# Patient Record
Sex: FEMALE | Race: WHITE | NOT HISPANIC OR LATINO | Employment: FULL TIME | URBAN - METROPOLITAN AREA
[De-identification: names, ages, dates, MRNs, and addresses within clinical notes are randomized per-mention and may not be internally consistent; named-entity substitution may affect disease eponyms.]

---

## 2024-02-07 ENCOUNTER — OFFICE VISIT (OUTPATIENT)
Dept: PRIMARY CARE | Facility: CLINIC | Age: 59
End: 2024-02-07
Payer: COMMERCIAL

## 2024-02-07 VITALS
DIASTOLIC BLOOD PRESSURE: 80 MMHG | OXYGEN SATURATION: 99 % | WEIGHT: 109 LBS | RESPIRATION RATE: 16 BRPM | HEART RATE: 70 BPM | SYSTOLIC BLOOD PRESSURE: 122 MMHG | BODY MASS INDEX: 21.4 KG/M2 | HEIGHT: 60 IN

## 2024-02-07 DIAGNOSIS — R73.9 HYPERGLYCEMIA: ICD-10-CM

## 2024-02-07 DIAGNOSIS — Z00.00 ROUTINE GENERAL MEDICAL EXAMINATION AT A HEALTH CARE FACILITY: Primary | ICD-10-CM

## 2024-02-07 DIAGNOSIS — Z12.31 SCREENING MAMMOGRAM FOR BREAST CANCER: ICD-10-CM

## 2024-02-07 DIAGNOSIS — Z13.6 SCREENING FOR CARDIOVASCULAR CONDITION: ICD-10-CM

## 2024-02-07 DIAGNOSIS — E53.8 B12 DEFICIENCY: ICD-10-CM

## 2024-02-07 DIAGNOSIS — M15.9 PRIMARY OSTEOARTHRITIS INVOLVING MULTIPLE JOINTS: ICD-10-CM

## 2024-02-07 DIAGNOSIS — Z13.29 SCREENING FOR HYPOTHYROIDISM: ICD-10-CM

## 2024-02-07 PROCEDURE — 99396 PREV VISIT EST AGE 40-64: CPT | Performed by: INTERNAL MEDICINE

## 2024-02-07 PROCEDURE — 1036F TOBACCO NON-USER: CPT | Performed by: INTERNAL MEDICINE

## 2024-02-07 RX ORDER — HYDROXYZINE HYDROCHLORIDE 50 MG/1
50 TABLET, FILM COATED ORAL EVERY 24 HOURS
Qty: 90 TABLET | Refills: 3 | Status: SHIPPED | OUTPATIENT
Start: 2024-02-07 | End: 2025-02-06

## 2024-02-07 RX ORDER — HYDROXYZINE HYDROCHLORIDE 50 MG/1
50 TABLET, FILM COATED ORAL EVERY 24 HOURS
COMMUNITY
Start: 2022-12-21 | End: 2024-02-07 | Stop reason: SDUPTHER

## 2024-02-07 ASSESSMENT — PAIN SCALES - GENERAL: PAINLEVEL: 0-NO PAIN

## 2024-02-07 ASSESSMENT — PATIENT HEALTH QUESTIONNAIRE - PHQ9
SUM OF ALL RESPONSES TO PHQ9 QUESTIONS 1 AND 2: 0
1. LITTLE INTEREST OR PLEASURE IN DOING THINGS: NOT AT ALL
2. FEELING DOWN, DEPRESSED OR HOPELESS: NOT AT ALL

## 2024-02-07 ASSESSMENT — ENCOUNTER SYMPTOMS
OCCASIONAL FEELINGS OF UNSTEADINESS: 0
LOSS OF SENSATION IN FEET: 0
DEPRESSION: 0

## 2024-02-12 ENCOUNTER — LAB (OUTPATIENT)
Dept: LAB | Facility: LAB | Age: 59
End: 2024-02-12
Payer: COMMERCIAL

## 2024-02-12 DIAGNOSIS — E53.8 B12 DEFICIENCY: ICD-10-CM

## 2024-02-12 DIAGNOSIS — Z12.31 SCREENING MAMMOGRAM FOR BREAST CANCER: ICD-10-CM

## 2024-02-12 DIAGNOSIS — Z13.6 SCREENING FOR CARDIOVASCULAR CONDITION: ICD-10-CM

## 2024-02-12 DIAGNOSIS — M15.9 PRIMARY OSTEOARTHRITIS INVOLVING MULTIPLE JOINTS: ICD-10-CM

## 2024-02-12 DIAGNOSIS — Z00.00 ROUTINE GENERAL MEDICAL EXAMINATION AT A HEALTH CARE FACILITY: ICD-10-CM

## 2024-02-12 DIAGNOSIS — R73.9 HYPERGLYCEMIA: ICD-10-CM

## 2024-02-12 DIAGNOSIS — Z13.29 SCREENING FOR HYPOTHYROIDISM: ICD-10-CM

## 2024-02-12 LAB
ALBUMIN SERPL BCP-MCNC: 4.4 G/DL (ref 3.4–5)
ALP SERPL-CCNC: 40 U/L (ref 33–110)
ALT SERPL W P-5'-P-CCNC: 13 U/L (ref 7–45)
ANION GAP SERPL CALC-SCNC: 15 MMOL/L (ref 10–20)
AST SERPL W P-5'-P-CCNC: 22 U/L (ref 9–39)
BASOPHILS # BLD AUTO: 0.03 X10*3/UL (ref 0–0.1)
BASOPHILS NFR BLD AUTO: 0.5 %
BILIRUB SERPL-MCNC: 0.4 MG/DL (ref 0–1.2)
BUN SERPL-MCNC: 24 MG/DL (ref 6–23)
CALCIUM SERPL-MCNC: 9.3 MG/DL (ref 8.6–10.3)
CCP IGG SERPL-ACNC: <1 U/ML
CHLORIDE SERPL-SCNC: 103 MMOL/L (ref 98–107)
CHOLEST SERPL-MCNC: 201 MG/DL (ref 0–199)
CHOLESTEROL/HDL RATIO: 2.2
CO2 SERPL-SCNC: 26 MMOL/L (ref 21–32)
CREAT SERPL-MCNC: 0.87 MG/DL (ref 0.5–1.05)
EGFRCR SERPLBLD CKD-EPI 2021: 78 ML/MIN/1.73M*2
EOSINOPHIL # BLD AUTO: 0.06 X10*3/UL (ref 0–0.7)
EOSINOPHIL NFR BLD AUTO: 1 %
ERYTHROCYTE [DISTWIDTH] IN BLOOD BY AUTOMATED COUNT: 13 % (ref 11.5–14.5)
ERYTHROCYTE [SEDIMENTATION RATE] IN BLOOD BY WESTERGREN METHOD: 4 MM/H (ref 0–30)
EST. AVERAGE GLUCOSE BLD GHB EST-MCNC: 94 MG/DL
GLUCOSE SERPL-MCNC: 84 MG/DL (ref 74–99)
HBA1C MFR BLD: 4.9 %
HCT VFR BLD AUTO: 39.4 % (ref 36–46)
HDLC SERPL-MCNC: 92.8 MG/DL
HGB BLD-MCNC: 12.8 G/DL (ref 12–16)
IMM GRANULOCYTES # BLD AUTO: 0.01 X10*3/UL (ref 0–0.7)
IMM GRANULOCYTES NFR BLD AUTO: 0.2 % (ref 0–0.9)
LDLC SERPL CALC-MCNC: 100 MG/DL
LYMPHOCYTES # BLD AUTO: 1.52 X10*3/UL (ref 1.2–4.8)
LYMPHOCYTES NFR BLD AUTO: 25.3 %
MCH RBC QN AUTO: 30.5 PG (ref 26–34)
MCHC RBC AUTO-ENTMCNC: 32.5 G/DL (ref 32–36)
MCV RBC AUTO: 94 FL (ref 80–100)
MONOCYTES # BLD AUTO: 0.61 X10*3/UL (ref 0.1–1)
MONOCYTES NFR BLD AUTO: 10.1 %
NEUTROPHILS # BLD AUTO: 3.78 X10*3/UL (ref 1.2–7.7)
NEUTROPHILS NFR BLD AUTO: 62.9 %
NON HDL CHOLESTEROL: 108 MG/DL (ref 0–149)
NRBC BLD-RTO: 0 /100 WBCS (ref 0–0)
PLATELET # BLD AUTO: 324 X10*3/UL (ref 150–450)
POTASSIUM SERPL-SCNC: 4.2 MMOL/L (ref 3.5–5.3)
PROT SERPL-MCNC: 6.7 G/DL (ref 6.4–8.2)
RBC # BLD AUTO: 4.19 X10*6/UL (ref 4–5.2)
SODIUM SERPL-SCNC: 140 MMOL/L (ref 136–145)
TRIGL SERPL-MCNC: 41 MG/DL (ref 0–149)
TSH SERPL-ACNC: 1.95 MIU/L (ref 0.44–3.98)
VIT B12 SERPL-MCNC: 366 PG/ML (ref 211–911)
VLDL: 8 MG/DL (ref 0–40)
WBC # BLD AUTO: 6 X10*3/UL (ref 4.4–11.3)

## 2024-02-12 PROCEDURE — 82607 VITAMIN B-12: CPT

## 2024-02-12 PROCEDURE — 84443 ASSAY THYROID STIM HORMONE: CPT

## 2024-02-12 PROCEDURE — 85652 RBC SED RATE AUTOMATED: CPT

## 2024-02-12 PROCEDURE — 83036 HEMOGLOBIN GLYCOSYLATED A1C: CPT

## 2024-02-12 PROCEDURE — 36415 COLL VENOUS BLD VENIPUNCTURE: CPT

## 2024-02-12 PROCEDURE — 80053 COMPREHEN METABOLIC PANEL: CPT

## 2024-02-12 PROCEDURE — 86200 CCP ANTIBODY: CPT

## 2024-02-12 PROCEDURE — 85025 COMPLETE CBC W/AUTO DIFF WBC: CPT

## 2024-02-12 PROCEDURE — 80061 LIPID PANEL: CPT

## 2024-02-19 ASSESSMENT — ENCOUNTER SYMPTOMS
CHILLS: 0
FREQUENCY: 0
WEAKNESS: 0
SORE THROAT: 0
DIFFICULTY URINATING: 0
PALPITATIONS: 0
ABDOMINAL PAIN: 0
HEMATURIA: 0
SLEEP DISTURBANCE: 0
HEADACHES: 0
CONSTIPATION: 0
DIARRHEA: 0
SINUS PAIN: 0
JOINT SWELLING: 0
SHORTNESS OF BREATH: 0
NAUSEA: 0
FATIGUE: 0
APPETITE CHANGE: 0
COUGH: 0
RHINORRHEA: 0
DIZZINESS: 0
ARTHRALGIAS: 0
NERVOUS/ANXIOUS: 0
FEVER: 0
VOMITING: 0

## 2024-03-07 ENCOUNTER — OFFICE VISIT (OUTPATIENT)
Dept: OBSTETRICS AND GYNECOLOGY | Facility: CLINIC | Age: 59
End: 2024-03-07
Payer: COMMERCIAL

## 2024-03-07 VITALS
SYSTOLIC BLOOD PRESSURE: 125 MMHG | DIASTOLIC BLOOD PRESSURE: 77 MMHG | HEIGHT: 60 IN | BODY MASS INDEX: 22.07 KG/M2 | WEIGHT: 112.4 LBS

## 2024-03-07 DIAGNOSIS — Z12.31 ENCOUNTER FOR SCREENING MAMMOGRAM FOR MALIGNANT NEOPLASM OF BREAST: ICD-10-CM

## 2024-03-07 DIAGNOSIS — Z11.51 SCREENING FOR HUMAN PAPILLOMAVIRUS (HPV): ICD-10-CM

## 2024-03-07 DIAGNOSIS — Z01.419 ENCOUNTER FOR ANNUAL ROUTINE GYNECOLOGICAL EXAMINATION: Primary | ICD-10-CM

## 2024-03-07 PROCEDURE — 99396 PREV VISIT EST AGE 40-64: CPT

## 2024-03-07 PROCEDURE — 88175 CYTOPATH C/V AUTO FLUID REDO: CPT | Mod: TC,GCY

## 2024-03-07 PROCEDURE — 1036F TOBACCO NON-USER: CPT

## 2024-03-07 PROCEDURE — 87624 HPV HI-RISK TYP POOLED RSLT: CPT

## 2024-03-07 ASSESSMENT — ENCOUNTER SYMPTOMS
NAUSEA: 0
CHILLS: 0
FEVER: 0
FATIGUE: 0
UNEXPECTED WEIGHT CHANGE: 0
DIZZINESS: 0
HEADACHES: 0
COUGH: 0
DYSURIA: 0
DEPRESSION: 0
VOMITING: 0
COLOR CHANGE: 0
SHORTNESS OF BREATH: 0
OCCASIONAL FEELINGS OF UNSTEADINESS: 0
ABDOMINAL PAIN: 0
LOSS OF SENSATION IN FEET: 0

## 2024-03-07 ASSESSMENT — LIFESTYLE VARIABLES
SKIP TO QUESTIONS 9-10: 1
HOW OFTEN DO YOU HAVE A DRINK CONTAINING ALCOHOL: 2-4 TIMES A MONTH
AUDIT-C TOTAL SCORE: 2
HOW OFTEN DO YOU HAVE SIX OR MORE DRINKS ON ONE OCCASION: NEVER
HOW MANY STANDARD DRINKS CONTAINING ALCOHOL DO YOU HAVE ON A TYPICAL DAY: 1 OR 2

## 2024-03-07 ASSESSMENT — PATIENT HEALTH QUESTIONNAIRE - PHQ9
SUM OF ALL RESPONSES TO PHQ9 QUESTIONS 1 & 2: 0
2. FEELING DOWN, DEPRESSED OR HOPELESS: NOT AT ALL
1. LITTLE INTEREST OR PLEASURE IN DOING THINGS: NOT AT ALL

## 2024-03-07 ASSESSMENT — PAIN SCALES - GENERAL: PAINLEVEL: 0-NO PAIN

## 2024-03-07 NOTE — LETTER
March 21, 2024     Lashawn Sherwood  [address] [facility]  [place] [address]      Dear Ms. Sherwood:    Below are the results from your recent visit:    Resulted Orders   THINPREP PAP TEST   Result Value Ref Range    Case Report       Gynecologic Cytology                              Case: [id number]                                   Authorizing Provider:  Krystin Morales PA-C        Collected:           03/07/2024 0940              Ordering Location:     [facility]       Received:            03/07/2024 [address] [place] [address]                                                                First Screen:          BAN Banuelos                                                           Specimen:    ThinPrep Liquid-Based Pap-Imaging System Screen, ECTO/ENDOCERVIX/VAGINA, SCREENING         Final Cytological Interpretation           A. THINPREP PAP ECTO/ENDOCERVIX/VAGINA, SCREENING -     Specimen Adequacy  Satisfactory for evaluation; endocervical/transformation zone component cannot be assessed because of severe atrophy    General Categorization  Negative for intraepithelial lesion or malignancy.    Descriptive Interpretation  Negative for intraepithelial lesion or malignancy                    Slide(s) initially screened by BAN Banuelos at [facility] [address] [place] [address] [place] [address]  By the signature on this report, the individual or group listed as making the Final Interpretation/Diagnosis certifies that they have reviewed this case.       ThinPrep Imaging System       This specimen has been analyzed by the ThinPrep Imaging System ([employer] an automated imaging and review system, which assists the laboratory in evaluating cells on ThinPrep Pap tests. Following automated imaging, selected fields from every slide were reviewed by a cytotechnologist and/or pathologist.        Educational Note       Cervical cytology is a screening procedure primarily  for squamous cancers and precursors and has associated false-negative and false-positives results as evidenced by published data. Your patient's test should be interpreted in this context, together with the patient's history and clinical findings. Regular sampling and follow-up of unexplained clinical signs and symptoms are recommended to minimize false negative results.      Perform HPV HR test? Always (all interpretations)     Include HPV Genotype? Yes     Menstrual status       Post-menopausal     HPV DNA High Risk With Genotype   Result Value Ref Range    HPV, high-risk Negative Negative    HPV Type 16 DNA Negative Negative    HPV Type 18 DNA Negative Negative    HPV non-Type 16 or 18 DNA Negative Negative    Narrative     Testing for high-risk (HR) types of human papilloma virus (HPV) is performed by the [employer] yariel HPV Test. The yariel HPV Test is a qualitative polymerase chain reaction that amplifies DNA of HPV16, HPV18, and 12 other high-risk HPV types (31, 33, 35, 39, 45, 51, 52, 56, 58, 59, 66, and 68) associated with cervical cancer and its precursor lesions. A positive result indicates the presence of HPV DNA due to one or more of the 14 genotypes: 16, 18, 31, 33, 35, 39, 45, 51, 52, 56, 58, 59, 66, and 68. Negative results indicates HPV DNA concentrations are undectectable or below the pre-set threshold for detection. False negative results may be associated with unoptimized sampling. A negative HR HPV result does not exclude the possibility of future cytologic HSIL or underlying CIN2-3 or cancer.   This test is approved by the [place] Food and Drug Administration. Results of this test should be interpreted in conjunction with the patient Pap test results. Please refer to ASCCP current quidelines for the use of HPV DNA testing, result interpretation, and patient management.   The performance of this test was verified by the [facility] at [facility]. The  lab is certified under the Clinical Laboratory Amendments of 1988 (CLIA 88) as qualified to perform high complexity clinical laboratory testing.    PERFORMING LAB LOCATIONS  [facility]: [address], [place] [address]         If you have any questions or concerns, please don't hesitate to call.         Sincerely,        Krystin Morales PA-C

## 2024-03-07 NOTE — PROGRESS NOTES
"Subjective   Lashawn Sherwood is a 58 y.o. female who is here for a routine GYN exam. Last saw Dr. Tan 3/2023. Doing well, no concerns or complaints. Just got back from 7 day [employer] cruise! Denies vaginal bleeding. Denies pelvic pain, pressure, or bloating. Denies breast changes or concerns. Denies hematochezia, rectal bleeding, or bowel changes.      Complaints:   none  Periods: menopausal  Pain:    none    HRT: no  History of abnormal Pap smear: yes  History of abnormal mammogram: no      OB History          1    Para        Term                AB   1    Living   0         SAB   1    IAB        Ectopic        Multiple        Live Births                      Review of Systems   Constitutional:  Negative for chills, fatigue, fever and unexpected weight change.   Respiratory:  Negative for cough and shortness of breath.    Gastrointestinal:  Negative for abdominal pain, nausea and vomiting.   Genitourinary:  Negative for dyspareunia, dysuria, pelvic pain and vaginal discharge.   Skin:  Negative for color change and rash.   Neurological:  Negative for dizziness and headaches.       Objective   /77   Ht 1.511 m (4' 11.5\")   Wt 51 kg (112 lb 6.4 oz)   BMI 22.32 kg/m²        General:   Alert and oriented, in no acute distress   Neck: Supple. No visible thyromegaly.    Breast/Axilla: Normal to palpation bilaterally without masses, skin changes, or nipple discharge.    Abdomen: Soft, non-tender, without masses or organomegaly   Vulva: Normal architecture without erythema, masses, or lesions.    Vagina: Normal mucosa without lesions, masses.  Positive atrophy. No abnormal vaginal discharge.    Cervix: Normal without masses, lesions, or signs of cervicitis. Pap performed    Uterus: Normal mobile, non-enlarged uterus    Adnexa: Normal without masses or lesions   Pelvic Floor No POP noted. No high tone pelvic floor    Psych Normal affect. Normal mood.      Assessment/Plan   -Due for pap smear, " obtained.  -Mammogram scheduled for 4/1/24.  -UTD on colonoscopy, 2018, next due 2028.    Krystin Morales PA-C

## 2024-04-01 ENCOUNTER — HOSPITAL ENCOUNTER (OUTPATIENT)
Dept: RADIOLOGY | Facility: HOSPITAL | Age: 59
Discharge: HOME | End: 2024-04-01
Payer: COMMERCIAL

## 2024-04-01 VITALS — WEIGHT: 112 LBS | BODY MASS INDEX: 21.99 KG/M2 | HEIGHT: 60 IN

## 2024-04-01 PROCEDURE — 77067 SCR MAMMO BI INCL CAD: CPT | Performed by: RADIOLOGY

## 2024-04-01 PROCEDURE — 77063 BREAST TOMOSYNTHESIS BI: CPT | Performed by: RADIOLOGY

## 2024-04-01 PROCEDURE — 77067 SCR MAMMO BI INCL CAD: CPT

## 2025-02-07 ENCOUNTER — APPOINTMENT (OUTPATIENT)
Dept: PRIMARY CARE | Facility: CLINIC | Age: 60
End: 2025-02-07
Payer: COMMERCIAL